# Patient Record
Sex: FEMALE | Race: WHITE | NOT HISPANIC OR LATINO | URBAN - METROPOLITAN AREA
[De-identification: names, ages, dates, MRNs, and addresses within clinical notes are randomized per-mention and may not be internally consistent; named-entity substitution may affect disease eponyms.]

---

## 2018-10-02 ENCOUNTER — HOSPITAL ENCOUNTER (OUTPATIENT)
Dept: RADIOLOGY | Facility: HOSPITAL | Age: 43
Discharge: HOME | End: 2018-10-02
Payer: COMMERCIAL

## 2018-10-02 ENCOUNTER — OFFICE VISIT (OUTPATIENT)
Dept: FAMILY MEDICINE CLINIC | Facility: CLINIC | Age: 43
End: 2018-10-02
Payer: COMMERCIAL

## 2018-10-02 VITALS
RESPIRATION RATE: 16 BRPM | HEIGHT: 66 IN | BODY MASS INDEX: 21.86 KG/M2 | OXYGEN SATURATION: 98 % | DIASTOLIC BLOOD PRESSURE: 80 MMHG | HEART RATE: 76 BPM | WEIGHT: 136 LBS | SYSTOLIC BLOOD PRESSURE: 110 MMHG

## 2018-10-02 DIAGNOSIS — M54.2 CERVICAL PAIN (NECK): ICD-10-CM

## 2018-10-02 DIAGNOSIS — L72.0 EPIDERMAL CYST: ICD-10-CM

## 2018-10-02 DIAGNOSIS — B07.8 OTHER VIRAL WARTS: Primary | ICD-10-CM

## 2018-10-02 PROCEDURE — 99213 OFFICE O/P EST LOW 20 MIN: CPT | Performed by: FAMILY MEDICINE

## 2018-10-02 PROCEDURE — 72050 X-RAY EXAM NECK SPINE 4/5VWS: CPT

## 2018-10-02 PROCEDURE — 17110 DESTRUCTION B9 LES UP TO 14: CPT | Performed by: FAMILY MEDICINE

## 2018-10-02 NOTE — PATIENT INSTRUCTIONS
Liquid nitrogen to work today  Reschedule for late elective excision of scalp cyst   Check x-ray cervical spine  Sleep with neck in the neutral position  A trial of Aleve 2 tablets twice daily for 10-14 days  If this does not help resolve the pain, either intensify chiropractic treatment or call for referral for the formal physical therapy

## 2018-10-02 NOTE — PROGRESS NOTES
[address]         NAME: Roni Figueredo is a 38 y o  female  : 1975    MRN: [id number]  DATE: 2018  TIME: 12:13 PM    Assessment and Plan   Other viral warts [B07 8]  1  Other viral warts     2  Epidermal cyst     3  Cervical pain (neck)         No problem-specific Assessment & Plan notes found for this encounter  Lesion Destruction  Date/Time: 10/2/2018 1:19 PM  Performed by: Mar Castellon  Authorized by: Mar Castellon     Procedure Details - Lesion Destruction:     Number of Lesions:  1  Lesion 1:     Body area:  Lower extremity    Lower extremity location:  L lower leg    Initial size (mm):  7    Malignancy: benign lesion      Destruction method: cryotherapy    Lesion 6:      Wart- liquid nitrogen- local care discussed          Patient Instructions     Patient Instructions   Liquid nitrogen to work today  Reschedule for late elective excision of scalp cyst   Check x-ray cervical spine  Sleep with neck in the neutral position  A trial of Aleve 2 tablets twice daily for 10-14 days  If this does not help resolve the pain, either intensify chiropractic treatment or call for referral for the formal physical therapy  Chief Complaint     Chief Complaint   Patient presents with    Cyst     cyst on scalp, skin lesion, neck pain    Physical Exam     HM         History of Present Illness       1) Epidermal cyst L posterior scalp  2) wart left ankle  3) Neck stiffness - long term- acute flare over 3-4 weeks  Review of Systems   Review of Systems   Constitutional: Negative for chills, fatigue and fever  Respiratory: Negative for cough, shortness of breath and wheezing  Cardiovascular: Negative for chest pain, palpitations and leg swelling  Musculoskeletal: Positive for neck pain and neck stiffness  Negative for arthralgias and myalgias  Current Medications     No current outpatient prescriptions on file      Current Allergies Allergies as of 10/02/2018    (No Known Allergies)            The following portions of the patient's history were reviewed and updated as appropriate: allergies, current medications, past family history, past medical history, past social history, past surgical history and problem list      No past medical history on file  No past surgical history on file  Family History   Problem Relation Age of Onset    No Known Problems Mother     Hypertension Father     Drug abuse Maternal Uncle     Depression Maternal Uncle          Medications have been verified  Objective   /80 (BP Location: Right arm, Patient Position: Sitting, Cuff Size: Standard)   Pulse 76   Resp 16   Ht 5' 6" (1 676 m)   Wt 61 7 kg (136 lb)   SpO2 98%   BMI 21 95 kg/m²        Physical Exam     Physical Exam   Constitutional: She appears well-developed and well-nourished  No distress  Cardiovascular: Normal rate and regular rhythm  Pulmonary/Chest: Effort normal and breath sounds normal  No respiratory distress  Musculoskeletal:        Cervical back: She exhibits decreased range of motion, tenderness and pain  She exhibits no bony tenderness, no deformity and no spasm  Skin:   Left-sided scalp-approximately 7-8 mm epidermal cyst, no erythema or inflammation noted  Left lower leg approximately a 5-6 mm wart present

## 2018-10-04 NOTE — PROGRESS NOTES
Call patient with lab result-call with neck x-ray report-does show some degenerative changes  I would advise formal physical therapy for 3-4 weeks  If not getting better may need to see pain management or get MRI

## 2018-10-05 ENCOUNTER — HOSPITAL ENCOUNTER (EMERGENCY)
Facility: HOSPITAL | Age: 43
Discharge: HOME | End: 2018-10-06
Attending: EMERGENCY MEDICINE | Admitting: EMERGENCY MEDICINE
Payer: COMMERCIAL

## 2018-10-05 ENCOUNTER — APPOINTMENT (EMERGENCY)
Dept: RADIOLOGY | Facility: HOSPITAL | Age: 43
End: 2018-10-05
Payer: COMMERCIAL

## 2018-10-05 ENCOUNTER — TELEPHONE (OUTPATIENT)
Dept: FAMILY MEDICINE CLINIC | Facility: CLINIC | Age: 43
End: 2018-10-05

## 2018-10-05 VITALS
SYSTOLIC BLOOD PRESSURE: 123 MMHG | RESPIRATION RATE: 15 BRPM | DIASTOLIC BLOOD PRESSURE: 62 MMHG | BODY MASS INDEX: 21.79 KG/M2 | WEIGHT: 135 LBS | HEART RATE: 61 BPM | OXYGEN SATURATION: 100 % | TEMPERATURE: 97.8 F

## 2018-10-05 DIAGNOSIS — S90.32XA CONTUSION OF LEFT FOOT, INITIAL ENCOUNTER: Primary | ICD-10-CM

## 2018-10-05 DIAGNOSIS — M50.90 CERVICAL DISC DISORDER: Primary | ICD-10-CM

## 2018-10-05 LAB — EXT PREG TEST URINE: NORMAL

## 2018-10-05 PROCEDURE — 99284 EMERGENCY DEPT VISIT MOD MDM: CPT

## 2018-10-05 PROCEDURE — 81025 URINE PREGNANCY TEST: CPT | Performed by: EMERGENCY MEDICINE

## 2018-10-05 PROCEDURE — 73630 X-RAY EXAM OF FOOT: CPT

## 2018-10-05 RX ORDER — IBUPROFEN 400 MG/1
400 TABLET ORAL ONCE
Status: COMPLETED | OUTPATIENT
Start: 2018-10-05 | End: 2018-10-05

## 2018-10-05 RX ADMIN — IBUPROFEN 400 MG: 400 TABLET, FILM COATED ORAL at 22:38

## 2018-10-05 NOTE — TELEPHONE ENCOUNTER
----- Message from Cora Hickey MD sent at 10/4/2018 11:43 AM EDT -----  Call patient with lab result-call with neck x-ray report-does show some degenerative changes  I would advise formal physical therapy for 3-4 weeks  If not getting better may need to see pain management or get MRI

## 2018-10-06 NOTE — DISCHARGE INSTRUCTIONS
Foot Contusion   WHAT YOU NEED TO KNOW:   A foot contusion is a bruise to the foot  DISCHARGE INSTRUCTIONS:   Medicines:   · NSAIDs:  These medicines decrease swelling and pain  NSAIDs are available without a doctor's order  Ask your healthcare provider which medicine is right for you  Ask how much to take and when to take it  Take as directed  NSAIDs can cause stomach bleeding and kidney problems if not taken correctly  · Take your medicine as directed  Contact your healthcare provider if you think your medicine is not helping or if you have side effects  Tell him of her if you are allergic to any medicine  Keep a list of the medicines, vitamins, and herbs you take  Include the amounts, and when and why you take them  Bring the list or the pill bottles to follow-up visits  Carry your medicine list with you in case of an emergency  Follow up with your healthcare provider as directed:  Write down your questions so you remember to ask them during your visits  Care for your foot: Follow your treatment plan to help decrease your pain and improve your muscle movement  · Rest:  You will need to rest your foot for 1 to 2 days after your injury  This will help decrease the risk of more damage  · Ice:  Ice helps decrease swelling and pain  Ice may also help prevent tissue damage  Use an ice pack, or put crushed ice in a plastic bag  Cover it with a towel and place it on your foot for 15 to 20 minutes every hour or as directed  · Compression:  Compression (tight hold) provides support and helps decrease swelling and movement so your foot can heal  You may be told to keep your foot wrapped with a tight elastic bandage  Follow instructions about how to apply your bandage  Do not massage your foot  You could cause more damage or pain  · Elevation:  Keep your foot raised above the level of your heart while you are sitting or lying down  This will help decrease or limit swelling   Use pillows, blankets, or rolled towels to elevate your foot comfortably  Exercise your foot:  You may be given gentle exercises to improve your foot movement and help decrease stiffness  Ask when you can return to your normal activities or sports  Prevent another injury:   · Wear equipment to protect yourself when you play sports  · Make sure your shoes fit properly  · Always wear shoes on streets or sidewalks  · Clean spills off the floor right away to avoid slipping or hitting your foot  · Make sure your home is well lit when you get up during the night  This will help you avoid hurting your foot in the dark  Contact your healthcare provider if:   · You have increased swelling on your foot  · You have severe foot pain  · You are not able to move your foot  · You have questions or concerns about your injury or treatment  © 2017 [employer]  Information is for End User's use only and may not be sold, redistributed or otherwise used for commercial purposes  All illustrations and images included in CareNotes® are the copyrighted property of A D [employer] M [employer]  or Daniel Prasad  The above information is an  only  It is not intended as medical advice for individual conditions or treatments  Talk to your doctor, nurse or pharmacist before following any medical regimen to see if it is safe and effective for you
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
## 2018-10-09 ENCOUNTER — VBI (OUTPATIENT)
Dept: ADMINISTRATIVE | Facility: OTHER | Age: 43
End: 2018-10-09

## 2018-10-10 NOTE — TELEPHONE ENCOUNTER
Roni Figueredo    ED Visit Information     Ed visit date: 10/05/2018  Diagnosis Description:Contusion of left foot, initial encounter  In Network? Yes [facility]  Discharge status: Home  Discharged with meds ? No  Number of ED visits to date: 1  ED Severity:4     Outreach Information    Outreach successful: Yes 1  Date letter mailed:N/a  Date Finalized:10/10/2018    Care Coordination    Follow up appointment with pcp: no Declined  Transportation issues ? No    Value [employer] type:  7 Day Outreach  Emergent necessity warranted by diagnosis:  No  [facility] PCP:  Yes  Transportation:  Self Transport  Called PCP first?:  No  Feels able to call PCP for urgent problems ?:  Yes  Understands what emergencies can be handled by PCP ?:  Yes  Ever any problems getting appointment with PCP for minor emergency/urgency problems?:  No  Practice Contacted Patient ?:  No  Pt had ED follow up with pcp/staff ?:  No    Seen for follow-up out of network ?:  No  Reason Patient went to ED instead of Urgent Care or PCP?:  Proximity  Urgent care Education?:  Yes  10/09/2018 03:54 PM Phone (Alesha Mac) Giovanni Casiano (Self) [phone] (H)   Left Message - Requesting a call back    10/10/2018 02:33 PM Phone (Incoming) Giovanni Casiano (Self) [phone] (H)   Personal communication with patient:    Patient stated that she is doing well s/p ED visit on 10/05/2018 for Contusion of left foot, initial encounter  Patient was discharged without medication and was advised to follow up with pcp as needed  Patient decliend to schedule f/u appt at this time  Patient does not meet OPCM criteria, denies any transportation issues and was not aware of her nearest [place] [address] urgent care facility, education given

## 2018-10-15 ENCOUNTER — EVALUATION (OUTPATIENT)
Dept: PHYSICAL THERAPY | Facility: CLINIC | Age: 43
End: 2018-10-15
Payer: COMMERCIAL

## 2018-10-15 DIAGNOSIS — M50.90 CERVICAL DISC DISORDER: ICD-10-CM

## 2018-10-15 PROCEDURE — 97140 MANUAL THERAPY 1/> REGIONS: CPT | Performed by: PHYSICAL THERAPIST

## 2018-10-15 PROCEDURE — 97161 PT EVAL LOW COMPLEX 20 MIN: CPT | Performed by: PHYSICAL THERAPIST

## 2018-10-15 PROCEDURE — 97110 THERAPEUTIC EXERCISES: CPT | Performed by: PHYSICAL THERAPIST

## 2018-10-15 PROCEDURE — G8982 BODY POS GOAL STATUS: HCPCS | Performed by: PHYSICAL THERAPIST

## 2018-10-15 PROCEDURE — G8981 BODY POS CURRENT STATUS: HCPCS | Performed by: PHYSICAL THERAPIST

## 2018-10-15 NOTE — PROGRESS NOTES
PT Evaluation     Today's date: 10/15/2018  Patient name: Willem Flores  : 1975  MRN: [id number]  Referring provider: Aide Colvin MD  Dx:   Encounter Diagnosis     ICD-10-CM    1  Cervical disc disorder M50 90 Ambulatory referral to Physical Therapy       Start Time: 1400  Stop Time: 1455  Total time in clinic (min): 55 minutes    Assessment  Functional limitations: pain with prolonged sitting, pain with driving, pain with end range rotation both directions, pain with computer work  Assessment details: Pt is a 39 yo female with diagnosis of cervical degenerative disc disease presenting with decreased ROM, decreased scapular stability, postural deficits, and the above functional limitations  She is an excellent candidate for outpatient physical therapy to address the above limitations and optimize functional status  Understanding of Dx/Px/POC: good   Prognosis: good    Goals  STG - 2-3 weeks  1  Independent with HEP  2  Improve postural awareness to decrease forward head posture during daily tasks     LTG - 4-6 weeks  1  Increase strength of scapular stabilizers by 1 MMT grade to improve posture  2  Tolerate sitting/driving 1-2 hours without pain to improve quality of life  3  Improve ROM of C/S retraction and extension by 25% to improve posture and body mechanics        Plan  Patient would benefit from: skilled physical therapy  Planned modality interventions: low level laser therapy, unattended electrical stimulation, cryotherapy and thermotherapy: hydrocollator packs  Planned therapy interventions: manual therapy, neuromuscular re-education, therapeutic activities, therapeutic exercise, patient education, postural training and home exercise program  Frequency: 2x week  Duration in weeks: 6  Treatment plan discussed with: patient  Plan details: Skilled outpatient PT 1-2x/week x 4-6 weeks  Provided with and reviewed initial written HEP        Subjective Evaluation    History of Present Illness  Date of onset: 2011  Mechanism of injury: Pt began having neck pain in  exacerbated by working at the computer, carrying her young son, and breastfeeding  She saw a chiropractor regularly in the past but has not been going recently  She arrives with referral to outpatient PT today  PMH significant for miscarriage with D&E, tubal ligation  Pain  No pain reported  Current pain ratin  At best pain ratin  At worst pain rating: 3  Location: neck (L>R)  Quality: sharp  Aggravating factors: keyboarding and sitting  Progression: no change    Social Support    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, 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, 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  Hand dominance: right  Exercise history: walking, exercise videos      Diagnostic Tests  X-ray: abnormal (degenerative disc disease, mild foraminal narrowing)  Treatments  Previous treatment: chiropractic  Patient Goals  Patient goals for therapy: decreased pain, increased motion and independence with ADLs/IADLs          Objective     Postural Observations  Seated posture: poor  Standing posture: fair  Correction of posture: makes symptoms better        Palpation   Left   Tenderness of the upper trapezius  Trigger point to upper trapezius  Right Tenderness of the upper trapezius  Trigger point to upper trapezius  Active Range of Motion   Cervical/Thoracic Spine   Cervical    Subcranial retraction: Active cervical subcranial retraction: 25% decreased     Flexion: 30 degrees with pain  Extension: 65 degrees   Left lateral flexion: 45 degrees   Right lateral flexion: 45 degrees   Left rotation: WFL and with pain  Right rotation: WFL and with pain    Thoracic   Flexion: WFL  Extension: [facility]    Strength/Myotome Testing     Left Shoulder     Planes of Motion   Flexion: 5   Abduction: 5   External rotation at 0°: 5   Internal rotation at 0°: 5     Isolated Muscles   Lower trapezius: 4-   Middle trapezius: 4     Right Shoulder     Planes of Motion   Flexion: 5   Abduction: 5   External rotation at 0°: 4   Internal rotation at 0°: 5     Isolated Muscles   Lower trapezius: 4-   Middle trapezius: 4     Left Elbow   Flexion: 5  Extension: 5    Right Elbow   Flexion: 5  Extension: 5    Left Wrist/Hand   Wrist extension: 5  Wrist flexion: 5    Right Wrist/Hand   Wrist extension: 5  Wrist flexion: 5    Tests   Cervical   Positive repeated extension      Additional Tests Details  Pain eliminated after unloaded repeated extension       Flowsheet Rows      Most Recent Value   PT/OT G-Codes   Current Score  75   Projected Score  77   Assessment Type  Evaluation   G code set  Carrying, Moving & Handling Objects   Changing and Maintaining Body Position Current Status ()  CJ   Changing and Maintaining Body Position Goal Status ()  CJ          Precautions: none    Daily Treatment Diary       Manuals 10/15            STM B upper traps             C/S retraction, retr w/ ext 8'                                      Exercise Diary              C/S retraction 5"x10            C/S retr w/ ext 5"x10            Y/T/I over pball 10x ea            Row w/ tband grn 15x            T-row w/ tband grn 15x            B ext w/ tband grn 15x            B ER w/ tband             T/S supine str over foam roll             I, T, chest press supine over foam roll                                                                                                                                                                                      Modalities

## 2018-10-16 ENCOUNTER — OFFICE VISIT (OUTPATIENT)
Dept: FAMILY MEDICINE CLINIC | Facility: CLINIC | Age: 43
End: 2018-10-16
Payer: COMMERCIAL

## 2018-10-16 VITALS
RESPIRATION RATE: 16 BRPM | SYSTOLIC BLOOD PRESSURE: 110 MMHG | WEIGHT: 135 LBS | HEIGHT: 66 IN | BODY MASS INDEX: 21.69 KG/M2 | DIASTOLIC BLOOD PRESSURE: 70 MMHG | HEART RATE: 60 BPM | OXYGEN SATURATION: 98 %

## 2018-10-16 DIAGNOSIS — L72.0 EPIDERMAL CYST: Primary | ICD-10-CM

## 2018-10-16 PROCEDURE — 11420 EXC H-F-NK-SP B9+MARG 0.5/<: CPT | Performed by: FAMILY MEDICINE

## 2018-10-16 NOTE — PROGRESS NOTES
Subjective:   Chief Complaint   Patient presents with    Procedure     cyst on scalp, skin lesion, rash        Patient ID: Anuja Coppola is a 38 y o  female  Patient here for elective removal of sebaceous cyst scalp  She wants removed due to pain when washing and combing her hair  It has slowly gotten larger over the last several months  The following portions of the patient's history were reviewed and updated as appropriate: allergies, current medications, past family history, past medical history, past social history, past surgical history and problem list     Review of Systems      Objective:  Vitals:    10/16/18 1200   BP: 110/70   BP Location: Right arm   Patient Position: Sitting   Cuff Size: Standard   Pulse: 60   Resp: 16   SpO2: 98%   Weight: 61 2 kg (135 lb)   Height: 5' 6" (1 676 m)      Physical Exam   Constitutional: She is oriented to person, place, and time  She appears well-developed and well-nourished  No distress  Cardiovascular: Normal rate, regular rhythm and normal heart sounds  Pulmonary/Chest: Effort normal    Neurological: She is alert and oriented to person, place, and time  Skin:     Left occipital scalp - approximately 8 mm palpable nodule present  No induration erythema noted  Assessment/Plan:    No problem-specific Assessment & Plan notes found for this encounter  Skin excision  Date/Time: 10/16/2018 1:39 PM  Performed by: Qasim Booth  Authorized by: Qasim Booth     Procedure Details - Skin Excision:     Number of Lesions:  1  Lesion 1:     Body area:  Head/neck    Head/neck location:  Scalp    Initial size (mm):  8       Malignancy: benign lesion      Closure complexity: simple      Repair size (cm):  0 8       Epidermal cyst- no pus found when incision made  Simple closure with 2 sutures -4 0 nylon           Diagnoses and all orders for this visit:    Epidermal cyst

## 2018-10-22 ENCOUNTER — OFFICE VISIT (OUTPATIENT)
Dept: PHYSICAL THERAPY | Facility: CLINIC | Age: 43
End: 2018-10-22
Payer: COMMERCIAL

## 2018-10-22 DIAGNOSIS — M50.90 CERVICAL DISC DISORDER: Primary | ICD-10-CM

## 2018-10-22 PROCEDURE — 97110 THERAPEUTIC EXERCISES: CPT | Performed by: PHYSICAL THERAPIST

## 2018-10-22 PROCEDURE — 97112 NEUROMUSCULAR REEDUCATION: CPT | Performed by: PHYSICAL THERAPIST

## 2018-10-22 PROCEDURE — 97140 MANUAL THERAPY 1/> REGIONS: CPT | Performed by: PHYSICAL THERAPIST

## 2018-10-22 NOTE — PROGRESS NOTES
Daily Note     Today's date: 10/22/2018  Patient name: Mallorie Addison  : 1975  MRN: [id number]  Referring provider: Allyssa Singh MD  Dx:   Encounter Diagnosis     ICD-10-CM    1  Cervical disc disorder M50 90                   Subjective: Pt reports she's hardly had any pain over the last week and then pain is less severe when it does occur  Objective: See treatment diary below      Assessment: Tolerated treatment well  Patient exhibited good technique with therapeutic exercises  Reported feeling looser after session  Plan: Continue per plan of care     Precautions: none    Daily Treatment Diary       Manuals 10/15 10/22           STM B upper traps  10'           C/S retraction, retr w/ ext 8' 8'                                     Exercise Diary              C/S retraction 5"x10 5"x10           C/S retr w/ ext 5"x10 5"x10           Y/T/I over pball 10x ea 10x ea           Row w/ tband grn 15x grn 15x           T-row w/ tband grn 15x grn 15x           B ext w/ tband grn 15x grn 15x           B ER w/ tband  15x grn           T/S supine str over foam roll  30"x3           I, T, chest press supine over foam roll  10x ea                                                                                                                                                                                    Modalities

## 2018-10-25 ENCOUNTER — OFFICE VISIT (OUTPATIENT)
Dept: FAMILY MEDICINE CLINIC | Facility: CLINIC | Age: 43
End: 2018-10-25

## 2018-10-25 VITALS
HEIGHT: 66 IN | BODY MASS INDEX: 21.86 KG/M2 | SYSTOLIC BLOOD PRESSURE: 116 MMHG | DIASTOLIC BLOOD PRESSURE: 78 MMHG | WEIGHT: 136 LBS | HEART RATE: 66 BPM | OXYGEN SATURATION: 97 %

## 2018-10-25 DIAGNOSIS — Z23 NEED FOR VACCINATION: Primary | ICD-10-CM

## 2018-10-25 DIAGNOSIS — Z48.02 VISIT FOR SUTURE REMOVAL: ICD-10-CM

## 2018-10-25 PROCEDURE — 99024 POSTOP FOLLOW-UP VISIT: CPT | Performed by: FAMILY MEDICINE

## 2018-10-25 PROCEDURE — 1036F TOBACCO NON-USER: CPT | Performed by: FAMILY MEDICINE

## 2018-10-25 PROCEDURE — 3008F BODY MASS INDEX DOCD: CPT | Performed by: FAMILY MEDICINE

## 2018-10-25 NOTE — PROGRESS NOTES
Subjective:   Chief Complaint   Patient presents with    Suture / Staple Removal     on scalp-- we placed them         Patient ID: Agnieszka Quinn is a 38 y o  female  Here for suture removal   Laceration healing well  Suture removal  Date/Time: 10/25/2018 2:05 PM  Performed by: Avril Haywood by: Jon Marino     Patient location:  Clinic  Procedure details: Tools used:  Scissors    Wound appearance:  No sign(s) of infection and clean  Post-procedure details:     Post-removal:  No dressing applied  Comments:      Healing surgical incision from previous epidermal cysts removed by me  The following portions of the patient's history were reviewed and updated as appropriate: allergies, current medications, past family history, past medical history, past social history, past surgical history and problem list     Review of Systems      Objective:  Vitals:    10/25/18 1322   BP: 116/78   Pulse: 66   SpO2: 97%   Weight: 61 7 kg (136 lb)   Height: 5' 6" (1 676 m)      Physical Exam   Skin:   Healing laceration, no erythema or induration noted  Assessment/Plan:    No problem-specific Assessment & Plan notes found for this encounter         Diagnoses and all orders for this visit:    Need for vaccination  -     influenza vaccine, 8961-3444, quadrivalent, recombinant, PF, 0 5 mL, for patients 18-49 yr with comorbidities ([facility])    Other orders  -     Cancel: TDAP VACCINE GREATER THAN OR EQUAL TO 6YO IM

## 2018-10-29 ENCOUNTER — OFFICE VISIT (OUTPATIENT)
Dept: PHYSICAL THERAPY | Facility: CLINIC | Age: 43
End: 2018-10-29
Payer: COMMERCIAL

## 2018-10-29 DIAGNOSIS — M50.90 CERVICAL DISC DISORDER: Primary | ICD-10-CM

## 2018-10-29 PROCEDURE — 97112 NEUROMUSCULAR REEDUCATION: CPT | Performed by: PHYSICAL THERAPIST

## 2018-10-29 PROCEDURE — 97110 THERAPEUTIC EXERCISES: CPT | Performed by: PHYSICAL THERAPIST

## 2018-10-29 PROCEDURE — 97140 MANUAL THERAPY 1/> REGIONS: CPT | Performed by: PHYSICAL THERAPIST

## 2018-10-29 NOTE — PROGRESS NOTES
Daily Note     Today's date: 10/29/2018  Patient name: Emma Haddad  : 1975  MRN: [id number]  Referring provider: Juan Reyes MD  Dx:   Encounter Diagnosis     ICD-10-CM    1  Cervical disc disorder M50 90                   Subjective: Pt reports she was painting and moving furniture all weekend so she is feeling sore  She did her retractions and extensions last evening when she felt particularly sore and they relieved her pain  Objective: See treatment diary below      Assessment: Tolerated treatment well  Patient demonstrated fatigue post treatment      Plan: Continue per plan of care       Precautions: none    Daily Treatment Diary       Manuals 10/15 10/22 10/29          STM B upper traps  10' 10'          C/S retraction, retr w/ ext 8' 8' 8'                                    Exercise Diary              C/S retraction 5"x10 5"x10 5"x15          C/S retr w/ ext 5"x10 5"x10 5"x15          Y/T/I over pball 10x ea 10x ea 15x          Row w/ tband grn 15x grn 15x 20x GTB          T-row w/ tband grn 15x grn 15x 20x GTB          B ext w/ tband grn 15x grn 15x 20x GTB          B ER w/ tband  15x grn 20x GTB          T/S supine str over foam roll  30"x3 30"x3          W, I, T, chest press supine over foam roll  10x ea 10x ea                                                                                                                                                                                   Modalities

## 2018-11-05 ENCOUNTER — OFFICE VISIT (OUTPATIENT)
Dept: PHYSICAL THERAPY | Facility: CLINIC | Age: 43
End: 2018-11-05
Payer: COMMERCIAL

## 2018-11-05 DIAGNOSIS — M50.90 CERVICAL DISC DISORDER: Primary | ICD-10-CM

## 2018-11-05 PROCEDURE — 97110 THERAPEUTIC EXERCISES: CPT | Performed by: PHYSICAL THERAPIST

## 2018-11-05 PROCEDURE — G8986 CARRY D/C STATUS: HCPCS | Performed by: PHYSICAL THERAPIST

## 2018-11-05 PROCEDURE — G8985 CARRY GOAL STATUS: HCPCS | Performed by: PHYSICAL THERAPIST

## 2018-11-05 PROCEDURE — 97140 MANUAL THERAPY 1/> REGIONS: CPT | Performed by: PHYSICAL THERAPIST

## 2018-11-05 PROCEDURE — 97112 NEUROMUSCULAR REEDUCATION: CPT | Performed by: PHYSICAL THERAPIST

## 2018-11-05 NOTE — PROGRESS NOTES
Daily Note     Today's date: 2018  Patient name: Senia Guzman  : 1975  MRN: [id number]  Referring provider: Юлия Armijo MD  Dx:   Encounter Diagnosis     ICD-10-CM    1  Cervical disc disorder M50 90                   Subjective: Pt reports she is feeling much better and only has mild stiffness at end range left rotation  She is comfortable with her HEP and feels she can be discharged at this time  Objective: See treatment diary below      Assessment: Tolerated treatment well  Patient demonstrates normalized AROM and presents no continued functional limitations  Goals  STG - 2-3 weeks  1  Independent with HEP - MET  2  Improve postural awareness to decrease forward head posture during daily tasks - MET     LTG - 4-6 weeks  1  Increase strength of scapular stabilizers by 1 MMT grade to improve posture  - partially met  2  Tolerate sitting/driving 1-2 hours without pain to improve quality of life  - MET  3   Improve ROM of C/S retraction and extension by 25% to improve posture and body mechanics  - MET    Plan: Discharge to HEP    Precautions: none    Daily Treatment Diary       Manuals 10/15 10/22 10/29 11         STM B upper traps  10' 10' 8'         C/S retraction, retr w/ ext 8' 8' 8' 8'                                   Exercise Diary              C/S retraction 5"x10 5"x10 5"x15 5"x15         C/S retr w/ ext 5"x10 5"x10 5"x15 5"x15         Y/T/I over pball 10x ea 10x ea 15x          Row w/ tband grn 15x grn 15x 20x GTB 20x blue         T-row w/ tband grn 15x grn 15x 20x GTB 20x blue         B ext w/ tband grn 15x grn 15x 20x GTB 20x blue         B ER w/ tband  15x grn 20x GTB 20x blue         T/S supine str over foam roll  30"x3 30"x3 30"x3         W, I, T, chest press supine over foam roll  10x ea 10x ea 20x ea Modalities

## 2019-10-29 ENCOUNTER — OFFICE VISIT (OUTPATIENT)
Dept: FAMILY MEDICINE CLINIC | Facility: CLINIC | Age: 44
End: 2019-10-29
Payer: COMMERCIAL

## 2019-10-29 VITALS
DIASTOLIC BLOOD PRESSURE: 72 MMHG | HEART RATE: 65 BPM | BODY MASS INDEX: 22.34 KG/M2 | SYSTOLIC BLOOD PRESSURE: 110 MMHG | HEIGHT: 66 IN | WEIGHT: 139 LBS | OXYGEN SATURATION: 98 %

## 2019-10-29 DIAGNOSIS — B07.8 OTHER VIRAL WARTS: ICD-10-CM

## 2019-10-29 DIAGNOSIS — Z13.6 SCREENING FOR CARDIOVASCULAR CONDITION: ICD-10-CM

## 2019-10-29 DIAGNOSIS — Z00.00 ANNUAL PHYSICAL EXAM: Primary | ICD-10-CM

## 2019-10-29 DIAGNOSIS — F41.1 GENERALIZED ANXIETY DISORDER: ICD-10-CM

## 2019-10-29 DIAGNOSIS — R53.82 CHRONIC FATIGUE: ICD-10-CM

## 2019-10-29 DIAGNOSIS — Z12.31 SCREENING MAMMOGRAM, ENCOUNTER FOR: ICD-10-CM

## 2019-10-29 DIAGNOSIS — Z86.2 HISTORY OF IRON DEFICIENCY ANEMIA: ICD-10-CM

## 2019-10-29 PROCEDURE — 17110 DESTRUCTION B9 LES UP TO 14: CPT | Performed by: FAMILY MEDICINE

## 2019-10-29 PROCEDURE — 99396 PREV VISIT EST AGE 40-64: CPT | Performed by: FAMILY MEDICINE

## 2019-10-29 PROCEDURE — 3008F BODY MASS INDEX DOCD: CPT | Performed by: FAMILY MEDICINE

## 2019-10-29 PROCEDURE — 99214 OFFICE O/P EST MOD 30 MIN: CPT | Performed by: FAMILY MEDICINE

## 2019-10-29 NOTE — PATIENT INSTRUCTIONS

## 2019-10-29 NOTE — PROGRESS NOTES
ADULT ANNUAL PHYSICAL  [address]    NAME: Jewell Hernandez  AGE: 41 y o  SEX: female  : 1975     DATE: 10/29/2019     Assessment and Plan:     Problem List Items Addressed This Visit     None      Visit Diagnoses     Screening mammogram, encounter for    -  Primary    Relevant Orders    Mammo screening bilateral w cad    Annual physical exam              Immunizations and preventive care screenings were discussed with patient today  Appropriate education was printed on patient's after visit summary  Counseling:  Dental Health: discussed importance of regular tooth brushing, flossing, and dental visits  Injury prevention: discussed safety/seat belts, safety helmets, smoke detectors, carbon dioxide detectors, and smoking near bedding or upholstery  · Exercise: the importance of regular exercise/physical activity was discussed  Recommend exercise 3-5 times per week for at least 30 minutes  No follow-ups on file  Chief Complaint:     Chief Complaint   Patient presents with    Annual Exam    Verrucous Vulgaris     on right hand     Anxiety     for 1 years       History of Present Illness:     Adult Annual Physical   Patient here for a comprehensive physical exam  The patient reports see problem list     Diet and Physical Activity  · Diet/Nutrition: well balanced diet and consuming 3-5 servings of fruits/vegetables daily  · Exercise: 1-2 times a week on average  Depression Screening  PHQ-9 Depression Screening    PHQ-9:    Frequency of the following problems over the past two weeks:       Little interest or pleasure in doing things:  0 - not at all  Feeling down, depressed, or hopeless:  1 - several days  PHQ-2 Score:  1       General Health  · Sleep: sleeps well and gets 7-8 hours of sleep on average  · Hearing: normal - bilateral   · Vision: previous LASIK surgery  · Dental: regular dental visits         /GYN Health  · Patient is: premenopausal  · Last menstrual period: 10/7/19  · Contraceptive method: tubal ligation  Review of Systems:     Review of Systems   Constitutional: Positive for fatigue  Negative for activity change, appetite change, diaphoresis and fever  Respiratory: Negative for cough, chest tightness, shortness of breath and wheezing  Cardiovascular: Negative for chest pain, palpitations and leg swelling  Fast or slow heart rate   Gastrointestinal: Negative for abdominal pain, blood in stool, constipation, diarrhea, nausea and vomiting  Genitourinary: Negative for difficulty urinating, dysuria and frequency  Musculoskeletal: Negative for arthralgias, gait problem, joint swelling and myalgias  Neurological: Negative for dizziness, weakness, light-headedness, numbness and headaches  Psychiatric/Behavioral: Positive for dysphoric mood  Negative for agitation, confusion, sleep disturbance and suicidal ideas  The patient is nervous/anxious  Past Medical History:     Past Medical History:   Diagnosis Date    No known health problems       Past Surgical History:     Past Surgical History:   Procedure Laterality Date    TUBAL LIGATION        Social History:     Social History     Socioeconomic History    Marital status: /Civil Union     Spouse name: None    Number of children: None    Years of education: None    Highest education level: None   Occupational History    None   Social Needs    Financial resource strain: None    Food insecurity:     Worry: None     Inability: None    Transportation needs:     Medical: None     Non-medical: None   Tobacco Use    Smoking status: Never Smoker    Smokeless tobacco: Never Used   Substance and Sexual Activity    Alcohol use:  Yes    Drug use: No    Sexual activity: None   Lifestyle    Physical activity:     Days per week: None     Minutes per session: None    Stress: None   Relationships    Social connections:     Talks on phone: None     Gets together: None     Attends Mosque service: None     Active member of club or organization: None     Attends meetings of clubs or organizations: None     Relationship status: None    Intimate partner violence:     Fear of current or ex partner: None     Emotionally abused: None     Physically abused: None     Forced sexual activity: None   Other Topics Concern    None   Social History Narrative    None      Family History:     Family History   Problem Relation Age of Onset    No Known Problems Mother     Hypertension Father     Drug abuse Maternal Uncle     Depression Maternal Uncle       Current Medications:     No current outpatient medications on file  No current facility-administered medications for this visit  Allergies:     No Known Allergies   Physical Exam:     /72   Pulse 65   Ht 5' 6" (1 676 m)   Wt 63 kg (139 lb)   SpO2 98%   BMI 22 44 kg/m²     Physical Exam   Constitutional: She is oriented to person, place, and time  She appears well-developed and well-nourished  No distress  HENT:   Head: Normocephalic and atraumatic  Right Ear: Tympanic membrane, external ear and ear canal normal    Left Ear: Tympanic membrane, external ear and ear canal normal    Nose: No mucosal edema or rhinorrhea  Right sinus exhibits no maxillary sinus tenderness  Left sinus exhibits no maxillary sinus tenderness  Mouth/Throat: Uvula is midline  Mucous membranes are not pale and not dry  Normal dentition  No oropharyngeal exudate  Eyes: Pupils are equal, round, and reactive to light  EOM are normal  Right eye exhibits no discharge  Left eye exhibits no discharge  Neck: Normal range of motion  Neck supple  No thyromegaly present  Cardiovascular: Normal rate, regular rhythm, normal heart sounds and intact distal pulses  No murmur heard  Pulmonary/Chest: Effort normal and breath sounds normal  No respiratory distress  She has no wheezes  She has no rales  Abdominal: Soft  Bowel sounds are normal  She exhibits no distension  There is no tenderness  Musculoskeletal: Normal range of motion  She exhibits no edema or tenderness  Lymphadenopathy:     She has no cervical adenopathy  Neurological: She is alert and oriented to person, place, and time  No cranial nerve deficit  Skin: She is not diaphoretic  Psychiatric: She has a normal mood and affect   Her behavior is normal        Sen Acuña MD  [id number]

## 2019-10-29 NOTE — PROGRESS NOTES
Subjective:   Chief Complaint   Patient presents with    Annual Exam    Verrucous Vulgaris     on right hand     Anxiety     for 1 years         Patient ID: Daljit Varghese is a 41 y o  female  Patient comes into day for evaluation of ongoing fatigue for the last 1 year  There are some home stressors present due to step children care  PHQ-9 score is 5  There is certain amount of anxiety present  Patient has had counseling in the past   No current medications  Had been on Lexapro in the past   No recent metabolic screens  Patient also has a wart on her right hand  Not responding over-the-counter measures  The following portions of the patient's history were reviewed and updated as appropriate: allergies, current medications, past family history, past medical history, past social history, past surgical history and problem list     Review of Systems   Constitutional: Positive for fatigue  Negative for appetite change, chills, diaphoresis and fever  HENT: Positive for trouble swallowing  Negative for congestion, ear pain, rhinorrhea, sinus pressure and sore throat  Eyes: Negative for discharge, redness and itching  Respiratory: Negative for cough, shortness of breath and wheezing  Cardiovascular: Negative for chest pain and palpitations  Rapid or slow heart rate   Gastrointestinal: Negative for abdominal pain, diarrhea, nausea and vomiting  Musculoskeletal: Positive for neck stiffness  Psychiatric/Behavioral: Negative for behavioral problems, decreased concentration, dysphoric mood, sleep disturbance and suicidal ideas  The patient is nervous/anxious  Objective:  Vitals:    10/29/19 1051   BP: 110/72   Pulse: 65   SpO2: 98%   Weight: 63 kg (139 lb)   Height: 5' 6" (1 676 m)      Physical Exam   Constitutional: She is oriented to person, place, and time  She appears well-developed and well-nourished  No distress  HENT:   Head: Normocephalic and atraumatic     Right Ear: Tympanic membrane and external ear normal  No drainage  Left Ear: Tympanic membrane normal  No drainage  Mouth/Throat: No oropharyngeal exudate  Eyes: Conjunctivae and EOM are normal  Right eye exhibits no discharge  Left eye exhibits no discharge  Neck: Normal range of motion  Neck supple  No thyromegaly present  Cardiovascular: Normal rate, regular rhythm and normal heart sounds  Pulmonary/Chest: Effort normal  No respiratory distress  She has no wheezes  She has no rales  Abdominal: Soft  Lymphadenopathy:     She has no cervical adenopathy  Neurological: She is alert and oriented to person, place, and time  Skin:   Right hand-there is approximately a 9 mm wart on the dorsum of the right hand  Psychiatric: She has a normal mood and affect  Her behavior is normal            Assessment/Plan:    No problem-specific Assessment & Plan notes found for this encounter  Diagnoses and all orders for this visit:    Annual physical exam    Chronic fatigue  -     Comprehensive metabolic panel; Future  -     TSH, 3rd generation; Future  -     CBC and differential; Future  -     Vitamin D 25 hydroxy; Future  -     Comprehensive metabolic panel  -     TSH, 3rd generation  -     CBC and differential  -     Vitamin D 25 hydroxy    Generalized anxiety disorder  -     TSH, 3rd generation; Future  -     TSH, 3rd generation    History of iron deficiency anemia  -     CBC and differential; Future  -     Ferritin; Future  -     CBC and differential  -     Ferritin    Other viral warts    Screening mammogram, encounter for  -     Mammo screening bilateral w cad; Future    Screening for cardiovascular condition  -     Comprehensive metabolic panel; Future  -     Lipid panel;  Future  -     Comprehensive metabolic panel  -     Lipid panel      Lesion Destruction  Date/Time: 10/29/2019 12:49 PM  Performed by: Lias Serrano MD  Authorized by: Lisa Serrano MD     Procedure Details - Lesion Destruction:     Number of Lesions:  1  Lesion 1:     Body area:  Upper extremity    Upper extremity location:  R hand    Initial size (mm):  9    Malignancy: benign lesion      Destruction method: cryotherapy    Lesion 6:      Patient tolerated procedure well  Local cared reviewed

## 2019-11-02 ENCOUNTER — TELEPHONE (OUTPATIENT)
Dept: FAMILY MEDICINE CLINIC | Facility: CLINIC | Age: 44
End: 2019-11-02

## 2019-11-02 LAB
25(OH)D3+25(OH)D2 SERPL-MCNC: 53.4 NG/ML (ref 30–100)
ALBUMIN SERPL-MCNC: 4.4 G/DL (ref 3.5–5.5)
ALBUMIN/GLOB SERPL: 1.8 {RATIO} (ref 1.2–2.2)
ALP SERPL-CCNC: 38 IU/L (ref 39–117)
ALT SERPL-CCNC: 10 IU/L (ref 0–32)
AST SERPL-CCNC: 14 IU/L (ref 0–40)
BASOPHILS # BLD AUTO: 0 X10E3/UL (ref 0–0.2)
BASOPHILS NFR BLD AUTO: 1 %
BILIRUB SERPL-MCNC: 0.9 MG/DL (ref 0–1.2)
BUN SERPL-MCNC: 6 MG/DL (ref 6–24)
BUN/CREAT SERPL: 8 (ref 9–23)
CALCIUM SERPL-MCNC: 9.4 MG/DL (ref 8.7–10.2)
CHLORIDE SERPL-SCNC: 105 MMOL/L (ref 96–106)
CHOLEST SERPL-MCNC: 177 MG/DL (ref 100–199)
CHOLEST/HDLC SERPL: 3.3 RATIO (ref 0–4.4)
CO2 SERPL-SCNC: 22 MMOL/L (ref 20–29)
CREAT SERPL-MCNC: 0.78 MG/DL (ref 0.57–1)
EOSINOPHIL # BLD AUTO: 0.1 X10E3/UL (ref 0–0.4)
EOSINOPHIL NFR BLD AUTO: 1 %
ERYTHROCYTE [DISTWIDTH] IN BLOOD BY AUTOMATED COUNT: 13.1 % (ref 12.3–15.4)
FERRITIN SERPL-MCNC: 51 NG/ML (ref 15–150)
GLOBULIN SER-MCNC: 2.4 G/DL (ref 1.5–4.5)
GLUCOSE SERPL-MCNC: 98 MG/DL (ref 65–99)
HCT VFR BLD AUTO: 41.1 % (ref 34–46.6)
HDLC SERPL-MCNC: 53 MG/DL
HGB BLD-MCNC: 13.8 G/DL (ref 11.1–15.9)
IMM GRANULOCYTES # BLD: 0 X10E3/UL (ref 0–0.1)
IMM GRANULOCYTES NFR BLD: 0 %
LDLC SERPL CALC-MCNC: 109 MG/DL (ref 0–99)
LYMPHOCYTES # BLD AUTO: 2.1 X10E3/UL (ref 0.7–3.1)
LYMPHOCYTES NFR BLD AUTO: 33 %
MCH RBC QN AUTO: 30.8 PG (ref 26.6–33)
MCHC RBC AUTO-ENTMCNC: 33.6 G/DL (ref 31.5–35.7)
MCV RBC AUTO: 92 FL (ref 79–97)
MONOCYTES # BLD AUTO: 0.5 X10E3/UL (ref 0.1–0.9)
MONOCYTES NFR BLD AUTO: 8 %
NEUTROPHILS # BLD AUTO: 3.8 X10E3/UL (ref 1.4–7)
NEUTROPHILS NFR BLD AUTO: 57 %
PLATELET # BLD AUTO: 277 X10E3/UL (ref 150–450)
POTASSIUM SERPL-SCNC: 4.1 MMOL/L (ref 3.5–5.2)
PROT SERPL-MCNC: 6.8 G/DL (ref 6–8.5)
RBC # BLD AUTO: 4.48 X10E6/UL (ref 3.77–5.28)
SL AMB EGFR AFRICAN AMERICAN: 109 ML/MIN/1.73
SL AMB EGFR NON AFRICAN AMERICAN: 95 ML/MIN/1.73
SL AMB VLDL CHOLESTEROL CALC: 15 MG/DL (ref 5–40)
SODIUM SERPL-SCNC: 140 MMOL/L (ref 134–144)
TRIGL SERPL-MCNC: 74 MG/DL (ref 0–149)
TSH SERPL DL<=0.005 MIU/L-ACNC: 4 UIU/ML (ref 0.45–4.5)
WBC # BLD AUTO: 6.5 X10E3/UL (ref 3.4–10.8)

## 2020-01-10 ENCOUNTER — TELEPHONE (OUTPATIENT)
Dept: FAMILY MEDICINE CLINIC | Facility: CLINIC | Age: 45
End: 2020-01-10

## 2020-01-10 NOTE — TELEPHONE ENCOUNTER
Patient says she keeps receiving reminders in mail to get a Mammogram done  Says she is not planning on getting a mammogram scheduled at this time  She wants to wait until she turns 52

## 2021-04-27 ENCOUNTER — HOSPITAL ENCOUNTER (OUTPATIENT)
Dept: MAMMOGRAPHY | Facility: IMAGING CENTER | Age: 46
Discharge: HOME | End: 2021-04-27
Payer: COMMERCIAL

## 2021-04-27 VITALS — BODY MASS INDEX: 21.53 KG/M2 | WEIGHT: 134 LBS | HEIGHT: 66 IN

## 2021-04-27 DIAGNOSIS — Z12.31 SCREENING MAMMOGRAM, ENCOUNTER FOR: ICD-10-CM

## 2021-04-27 PROCEDURE — 77063 BREAST TOMOSYNTHESIS BI: CPT

## 2021-04-27 PROCEDURE — 77067 SCR MAMMO BI INCL CAD: CPT

## 2022-05-16 ENCOUNTER — HOSPITAL ENCOUNTER (OUTPATIENT)
Dept: MAMMOGRAPHY | Facility: IMAGING CENTER | Age: 47
Discharge: HOME | End: 2022-05-16
Payer: COMMERCIAL

## 2022-05-16 VITALS — BODY MASS INDEX: 22.66 KG/M2 | HEIGHT: 65 IN | WEIGHT: 136 LBS

## 2022-05-16 DIAGNOSIS — Z12.31 SCREENING MAMMOGRAM FOR HIGH-RISK PATIENT: ICD-10-CM

## 2022-05-16 PROCEDURE — 77063 BREAST TOMOSYNTHESIS BI: CPT

## 2022-05-16 PROCEDURE — 77067 SCR MAMMO BI INCL CAD: CPT

## 2022-07-28 NOTE — TELEPHONE ENCOUNTER
----- Message from Valdo Hart MD sent at 11/2/2019  9:43 AM EDT -----  Call patient with lab result- labs all look good
Spoke with pt aware normal results
Gestational Hypertension

## 2023-03-07 ENCOUNTER — OFFICE VISIT (OUTPATIENT)
Dept: DERMATOLOGY | Facility: CLINIC | Age: 48
End: 2023-03-07

## 2023-03-07 VITALS — WEIGHT: 142.2 LBS | TEMPERATURE: 97.3 F | BODY MASS INDEX: 23.69 KG/M2 | HEIGHT: 65 IN

## 2023-03-07 DIAGNOSIS — L70.9 ACNE, UNSPECIFIED ACNE TYPE: Primary | ICD-10-CM

## 2023-03-07 RX ORDER — SPIRONOLACTONE 50 MG/1
TABLET, FILM COATED ORAL
Qty: 90 TABLET | Refills: 1 | Status: SHIPPED | OUTPATIENT
Start: 2023-03-07

## 2023-03-07 RX ORDER — ESCITALOPRAM OXALATE 5 MG/1
5 TABLET ORAL EVERY EVENING
COMMUNITY
Start: 2022-12-19

## 2023-03-07 NOTE — PROGRESS NOTES
[facility] Note     Patient Name: Gee Rivera  Encounter Date: 03/07/23     Have you been cared for by a [place] [id number] Dermatologist in the last 3 years and, if so, which description applies to you? NO  I am considered a "new" patient and must complete all patient intake questions  I am FEMALE/of child-bearing potential     REVIEW OF SYSTEMS:  Have you recently had or currently have any of the following? · Recent fever or chills? No  · Any non-healing wound? No  · Are you pregnant or planning to become pregnant? No  · Are you currently or planning to be nursing or breast feeding? No   PAST MEDICAL HISTORY:  Have you personally ever had or currently have any of the following? If "YES," then please provide more detail  · Skin cancer (such as Melanoma, Basal Cell Carcinoma, Squamous Cell Carcinoma? No  · Tuberculosis, HIV/AIDS, Hepatitis B or C: No  · Systemic Immunosuppression such as Diabetes, Biologic or Immunotherapy, Chemotherapy, Organ Transplantation, Bone Marrow Transplantation No  · Radiation Treatment No   FAMILY HISTORY:  Any "first degree relatives" (parent, brother, sister, or child) with the following? • Skin Cancer, Pancreatic or Other Cancer? No   PATIENT EXPERIENCE:    • Do you want the Dermatologist to perform a COMPLETE skin exam today including a clinical examination under the "bra and underwear" areas? NO  • If necessary, do we have your permission to call and leave a detailed message on your Preferred Phone number that includes your specific medical information?   Yes      No Known Allergies   Current Outpatient Medications:   •  escitalopram (LEXAPRO) 5 mg tablet, Take 5 mg by mouth every evening, Disp: , Rfl:           • Whom besides the patient is providing clinical information about today's encounter?   o NO ADDITIONAL HISTORIAN (patient alone provided history)    Physical Exam and Assessment/Plan by Diagnosis:    Acne     Physical Exam:  · Anatomic Location Affected &  Morphological Description:  papules, pustules, cysts on the face jawline  Scarring present on face (subacute)  · Pertinent Positives:  · Pertinent Negatives: Additional History of Present Condition:  Patient has had acne since 37 years ( since childhood) affecting the following body sites: face, chin and has scarring  Thus far, they have tried the following treatments: OTC medicine, red tayla face wash and Kiehl's face cream  Interested in scar treatment  Still gets several pimples before her period  Thinks she may be perimenopausal     Patient reports flares before or during menstrual cycle: yes, breakout before menstrual cycle    Patient currently taking any birth control or hormonal supplements: no    Family members required Accutane: no      Past medical history of:    Depression/anxiety: no  IBD in self or family members: no  History of liver disease: no  History of hyperlipidemia: no          Assessment and Plan: Primarily hormonal acne  Discussed options to include oral antibiotics, oral spironolactone, and topical therapy  Patient is not compliant with consistent topicals, and elects trial of low dose spironolactone for 3 months  • Reviewed the causes of acne, the “kinds” of acne, and the expected clinical course  • Discussed treatment options ranging from over-the-counter products, topical retinoids, antibiotics, BP, hormonal therapies (OCPs/spironolactone), and isotretinoin (Accutane)  • After lengthy discussion of etiology and treatment options, we decided to implement the following personalized treatment plan:    ACNE TREATMENT PLAN:      Morning Routine:     Wash:   Face, back, chest: Benzoyl Peroxide Wash, advised 2- 3 % strength and use in shower  Leave this wash on your skin for about 5 minutes and then rinse it off completely while in the shower  If you do not rinse it off completely, then it will bleach your towels or clothing          Moisturize:   Advised use of daily SPF 30+ sunblock       Nightly Routine:    Wash:  Face: With gentle cleanser such as Dove or Cerave or Cetaphil  If showering more than once daily, can use to back and chest as well  Moisturize:   If you are using other moisturizing products such as facial oils, use them at this step  Make sure they are NON-COMEDOGENIC (not pore clogging) otherwise they may be making your acne worse  We recommend a nightly gentle cream moisturizer such as Cetaphil or Cerave facial moisturizers  Again make usre if you do choose another brand that the cream is non-comedogenic  Acne Pill:  Take prescribed Spironolactone 25 mg nightly for 7 days, then increase to 50 mg nightly    The patient has findings consistent with hormonal acne and would be an excellent candidate for spironolactone  She denies a history of liver or kidney disease or hyperkalemia and eats a balanced diet  The risks of spironolactone were reviewed including birth defects, mood change, irritability, headache, liver injury, hyperkalemia, spotting, breast tenderness, hypotension, dizziness/lightheadedness/orthostatic symptoms, etc    She will maintain birth control or pregnancy prevention practices while taking it and is aware she cannot safely get pregnant on this medication  For any concerns, she will stop the medication and call the office  If the patient is under age 39 and without a history of liver or renal disease, hyperkalemia, or concerning medication interactions, current evidence based practice does not merit routine labs  The risks of estrogen sensitive cancers such as breast cancer shown in high dose animal studies were discussed, as was the most recent meta analysis in humans that fails to show this risk at normal doses used for acne  Follow up in 3 months, sooner for concerns  Consider adding doxycycline or accutane as needed  Given that patient is 47, will check baseline CMP and repeat in 1 month then q 6 months while using medication  She was counseled not to start medication until I contact her with lab results  Remember to always take your acne pills with lots of water! A pill stuck in your throat can cause significant burning and irritation  Drink a full glass of water to ensure the pill gets into your stomach  Avoid “popping” a pill right before bed, and stay upright for at least 1 hour after taking a pill      --------------------------------------------------------------------------------------   ANTIMICROBIAL BENZOYL PEROXIDE (BPO) options:  Below are a few options of BPO washes you may purchase  This medication is available without prescription (over-the-counter) in most drug stores or at [facility] for about $7 a bottle  It is available at a variety of strengths from 2% to 10%  Be sure to select the percentage recommended by us in your acne plan as discussed at your visit  Neutrogena Clear Pore (Benzoyl Peroxide 3% wash)         PanOxyl wash (2 5% or 4-5% preferred)     --------------------------------------------------------------------------------------        PATIENT INFORMATION       USING YOUR TOPICAL TREATMENTS LIKE A PRO  • Apply topical medications only to clean, dry skin  Topical medications may lead to significant dryness of the affected areas  To minimize this, wait 15-20 minutes after washing before applying your topical medication  • These medications work deep in the skin to prevent new breakouts  “Spot treatment” of individual pimples does not do much  When applying topical medications to the face, use the “5-dot” method  Start by placing a small pea-sized amount of the medication on your finger  Then, place “dots” in each of five locations of your face: Mid-forehead, each cheek, nose, and chin  Next, rub the medication into the entire area of skin - not just on individual pimples! Try to avoid the delicate skin around your eyes and corners of your mouth  • The medications are not magic!   They take weeks if not months to work  Be patient and use your medicine on a daily basis or as directed for six weeks before asking if your skin looks better  Try not to miss more than one or two days each week when using your medications  • If you are starting a new medication, then try using it “every other night” or even “every third night ” Gradually work up to [employer] a day ”  This will give your skin time to adjust   • The same medications often come in various forms or formulations: Creams, ointments, lotions, gels, microspheres, or foams  Use the formulation that has been recommended and don't switch to other forms unless instructed  Some forms (such as alcohol based gels) may be more drying and less tolerable for certain skin types  • Sometimes individual medications are not as effective as a combination of two or more agents  The doctor may need to try several medications or combinations before finding the one that is best for that patient  • Moisturizer, sunscreen, and make-up may be used in conjunction with topical acne medications  In general, acne medications are applied first so they may directly contact the skin  Ask your physician to review specific application instructions! • It is especially important to always use sunscreen when using a topical retinoid or oral antibiotic  These drugs can make your skin more sensitive to the sun  In general, sunscreen gets applied AFTER any acne medications  • Don't stop using your acne medications just because your acne got better  Remember, the acne is better because of the medication, and prevention is the eisenberg to treatment  ORAL ACNE MEDICATIONS    ORAL ANTIBIOTICS  Antibiotics include tetracycline-class medicines (which include the most commonly used oral antibiotics for acne, minocycline, and doxycycline), erythromycin, trimethoprim-sulfamethoxazole, and occasionally cephalexin or azithromycin   These drugs may decrease bacteria and inflammation, and they are most effective for moderate-to-severe “inflammatory” acne  A product containing benzoyl peroxide should be used along with these antibiotics to help decrease the possibility of microbial resistance  Always take your acne pills with lots of water! A pill stuck in your throat can cause significant burning and irritation  Drink a full glass of water to ensure the pill gets into your stomach  Avoid “popping” a pill right before bed, and stay upright for at least 1 hour after taking a pill  HAVING PROBLEMS WITH ANY OF YOUR TREATMENTS? You should not be able to see any of the medicines on your face  If you can see a white film on your skin after you apply the medication, there is too much medicine in that area and you need to apply a thinner coat and make sure it is spread evenly on your face  If your skin gets too dry, you can apply a light (“non-comedogenic”) moisturizer on top of your medicine or you may switch to using the medicine every other day instead of every day  If your skin is still too irritated, you may need to switch to a milder medication  If your skin is red and very itchy, you may be allergic to the medication and you should stop using it  COMMON POSSIBLE SIDE EFFECTS OF MEDICATIONS    • Retinoids - dryness, redness, increased sun sensitivity  • Benzoyl peroxide - drying, redness, bleaching of clothes, towels and sheets, allergy  • Doxycycline - headaches; dizziness; irritation of the throat; nail changes; discoloration of teeth  • Sun sensitivity - even if you have dark skin, this medicine can make you burn more easily  Make sure you protect yourself from the sun, either by avoiding being outside between 11 AM and 3 PM, wearing and reapplying sunscreen/sunblock, or wearing sun protective clothing  • Nausea/vomiting - if you experience nausea with this medication, take it with food    • Minocycline - headaches; dizziness; vision problems,  irritation of the throat; discoloration of scars, gums, or teeth  Can rarely cause liver disease, joint pains, and flu-like symptoms  • If you should notice yellowing of the skin or any of the above, notify your doctor and stop using the medication  • Birth Control Pills - nausea; headaches; breast tenderness; feeling bloated; mood changes  • Spotting between periods may occur for the first three weeks of the medication, but this is not serious  It may last for two or three cycles  Please call us if the bleeding is heavier than a light flow or lasts for more than a few days  WHEN AND WHERE TO CALL WITH CONCERNS  We are here to help! If you experience any unusual symptoms, then stop taking or using the medication and call our office at [phone] (SKIN)  It is better to be safe than to be sorry! Make sure all products you use on face are labeled as "non-comedongenic" or "oil-free" or " does not clog pores"  REMEMBER: Acne can be frustrating and difficult to treat  Most acne regimens take 2-3 months to see an improvement, so stick with them  Don't give up! As always, call your doctor if you have any concerns about your medications      Scribe Attestation    I,:  Lori Leahy am acting as a scribe while in the presence of the attending physician :       I,:  Marielle Campos MD personally performed the services described in this documentation    as scribed in my presence :

## 2023-03-07 NOTE — PATIENT INSTRUCTIONS
Assessment and Plan:   Reviewed the causes of acne, the “kinds” of acne, and the expected clinical course  Discussed treatment options ranging from over-the-counter products, topical retinoids, antibiotics, BP, hormonal therapies (OCPs/spironolactone), and isotretinoin (Accutane)  Reviewed specific over-the-counter interventions and medications  Recommended washing regularly with mild cleanser, and refraining from picking and popping any pimples  Recommended non-comedogenic sunscreen use daily  Expectations of therapy discussed  Side effects, risks and benefits of medications discussed  The phone number to call in case of questions or concerns (and instructions to stop medications in such a scenario) was provided  Counseled patient on treatment options  Discussed fading away of post inflammatory erythema and hyperpigmentation and that good sun protection and topical tretinoin would help with this  After lengthy discussion of etiology and treatment options, we decided to implement the following personalized treatment plan:    ACNE TREATMENT PLAN:      Morning Routine:     Wash:   Face, back, chest: Benzoyl Peroxide Wash, advised 2- 3 % strength and use in shower  Leave this wash on your skin for about 5 minutes and then rinse it off completely while in the shower  If you do not rinse it off completely, then it will bleach your towels or clothing  Moisturize:   Advised use of daily SPF 30+ sunblock        Remember to always take your acne pills with lots of water! A pill stuck in your throat can cause significant burning and irritation  Drink a full glass of water to ensure the pill gets into your stomach  Avoid “popping” a pill right before bed or a nap, and stay upright for at least 1 hour after taking a pill  Nightly Routine:    Wash:  Face: With gentle cleanser such as Dove or Cerave or Cetaphil  If showering more than once daily, can use to back and chest as well      Medicate:   Face: Tretinoin 0 025% cream  Before bedtime (after washing your face and allowing the skin to completely dry), spread only a single pea-sized amount of this medication    evenly over your entire face (avoiding your eyes or mouth)  If too irritating, advised to space out to every other night or every 3rd night and slowly increase frequency of use to nightly  Can also use on back and chest if you have these areas involved with acne  Moisturize:   If you are using other moisturizing products such as facial oils, use them at this step  Make sure they are NON-COMEDOGENIC (not pore clogging) otherwise they may be making your acne worse  We recommend a nightly gentle cream moisturizer such as Cetaphil or Cerave facial moisturizers  Again make usre if you do choose another brand that the cream is non-comedogenic  Acne Pill:  Take prescribed Spironolactone 25 mg nightly for 7 days, then increase to 50 mg nightly     Remember to always take your acne pills with lots of water! A pill stuck in your throat can cause significant burning and irritation  Drink a full glass of water to ensure the pill gets into your stomach  Avoid “popping” a pill right before bed, and stay upright for at least 1 hour after taking a pill      --------------------------------------------------------------------------------------   ANTIMICROBIAL BENZOYL PEROXIDE (BPO) options:  Below are a few options of BPO washes you may purchase  This medication is available without prescription (over-the-counter) in most drug stores or at [facility] for about $7 a bottle  It is available at a variety of strengths from 2% to 10%  Be sure to select the percentage recommended by us in your acne plan as discussed at your visit          Neutrogena Clear Pore (Benzoyl Peroxide 3% wash)         PanOxyl wash (2 5% or 4-5% preferred)     --------------------------------------------------------------------------------------        PATIENT INFORMATION       USING YOUR TOPICAL TREATMENTS LIKE A PRO  Apply topical medications only to clean, dry skin  Topical medications may lead to significant dryness of the affected areas  To minimize this, wait 15-20 minutes after washing before applying your topical medication  These medications work deep in the skin to prevent new breakouts  “Spot treatment” of individual pimples does not do much  When applying topical medications to the face, use the “5-dot” method  Start by placing a small pea-sized amount of the medication on your finger  Then, place “dots” in each of five locations of your face: Mid-forehead, each cheek, nose, and chin  Next, rub the medication into the entire area of skin - not just on individual pimples! Try to avoid the delicate skin around your eyes and corners of your mouth  The medications are not magic! They take weeks if not months to work  Be patient and use your medicine on a daily basis or as directed for six weeks before asking if your skin looks better  Try not to miss more than one or two days each week when using your medications  If you are starting a new medication, then try using it “every other night” or even “every third night ” Gradually work up to [facility] a day ”  This will give your skin time to adjust   The same medications often come in various forms or formulations: Creams, ointments, lotions, gels, microspheres, or foams  Use the formulation that has been recommended and don't switch to other forms unless instructed  Some forms (such as alcohol based gels) may be more drying and less tolerable for certain skin types  Sometimes individual medications are not as effective as a combination of two or more agents  The doctor may need to try several medications or combinations before finding the one that is best for that patient  Moisturizer, sunscreen, and make-up may be used in conjunction with topical acne medications   In general, acne medications are applied first so they may directly contact the skin  Ask your physician to review specific application instructions! It is especially important to always use sunscreen when using a topical retinoid or oral antibiotic  These drugs can make your skin more sensitive to the sun  In general, sunscreen gets applied AFTER any acne medications  Don't stop using your acne medications just because your acne got better  Remember, the acne is better because of the medication, and prevention is the eisenberg to treatment  ORAL ACNE MEDICATIONS    ORAL ANTIBIOTICS  Antibiotics include tetracycline-class medicines (which include the most commonly used oral antibiotics for acne, minocycline, and doxycycline), erythromycin, trimethoprim-sulfamethoxazole, and occasionally cephalexin or azithromycin  These drugs may decrease bacteria and inflammation, and they are most effective for moderate-to-severe “inflammatory” acne  A product containing benzoyl peroxide should be used along with these antibiotics to help decrease the possibility of microbial resistance  Always take your acne pills with lots of water! A pill stuck in your throat can cause significant burning and irritation  Drink a full glass of water to ensure the pill gets into your stomach  Avoid “popping” a pill right before bed, and stay upright for at least 1 hour after taking a pill  DOXYCYCLINE   This medication is usually taken ONCE or TWICE per day, as instructed by your physician  NOTE: Always take this medication with lots of water! A pill stuck in the throat can cause significant burning and irritation  Avoid “popping” a pill right before bed & stay upright for at least one hour after taking a pill  WARNING: Doxycycline increases your sensitivity to the sun, so practice excellent sun protection!  If you notice any of the following, stop using the medication and notify your health care provider: headaches; blurred vision; dizziness; sun sensitivity; heartburn-stomach pain; irritation of the esophagus; darkening of scars, gums, or teeth (more often with minocycline); nail changes; yellowing of the eyes or skin (indicating possible liver disease); joint pains-and flu-like symptoms  Taking oral antibiotics with food may help with symptoms of upset stomach  COMMON SIDE EFFECTS: Nausea, vomiting, sun sensitivity; irritation of the throat if taken right before lying down  Less commonly: Headaches; dizziness    The risks of doxycycline were discussed at length including nausea/vomiting/diarrhea, abdominal pain, photosensitivity, esophagitis, and bacterial resistance  The patient was instructed to take the medication with a full meal and a glass of water, and not to lie down for 1 hour after taking  The use of an otc probiotic while on the medication was encouraged to help maintain a healthy gut amee  Use should be limited to 6 months and combined with the use of topical benzoyl peroxide to mitigate the risk of bacterial resistance  We will attempt to limit use to 6 months of therapy and consider escalation to oral isotretinoin as needed if unable to achieve or maintain clearance after 6 months of consistent use  The patient was advised to stop the medication and contact me for concerns prior to their 3 month follow up visit  MINOCYCLINE   This medication is usually taken ONCE or TWICE per day, as instructed by your physician  NOTE: Always take this medication with lots of water! A pill stuck in the throat can cause significant burning and irritation  Avoid “popping” a pill right before bed & stay upright for at least one hour after taking a pill  WARNING: Though less likely than doxycycline, minocycline may increase your sensitivity to the sun, so practice excellent sun protection!  If you notice any of the following, stop using the medication and notify your health care provider: headaches; blurred vision; dizziness; sun sensitivity; heartburn-stomach pain; irritation of the throat; darkening of scars, gums, or teeth; nail changes; yellowing of the eyes or skin (indicating possible liver disease); joint pains-and flu-like symptoms  Taking oral antibiotics with food may help with symptoms of upset stomach  COMMON SIDE EFFECTS: Headaches; dizziness; sun sensitivity; irritation of the throat; discoloration of scars, gums, or teeth (often with minocycline); nail changes  Minocycline can rarely cause liver disease, joint pains, severe skin rashes, and flu-like symptoms  If you should notice yellowing of the eyes or skin, or any of the above, notify your doctor and stop using the medication immediately  The patient was counseled on the risks of minocycline, including but not limited to nausea/vomiting/diarrhea, photosensitivity, vertigo, dyspigmentation (including blue pigment developing within acne scars, on the face, and shins), drug induced liver injury, a lupus like hypersensitivity reaction, other allergic reactions, and the development of bacterial resistance  If being used for acne, use should be limited to 6 months and combined with the use of topical benzoyl peroxide to mitigate this risk  Use of a daily probiotic was also encouraged  We will attempt to limit use to 6 months of therapy and consider escalation to oral isotretinoin as needed if unable to achieve or maintain clearance after 6 months of consistent use  The patient was advised to stop the medication and contact me for concerns prior to their 3 month follow up visit  SPIRONOLACTONE  The patient has findings consistent with hormonal acne and would be an excellent candidate for spironolactone  She denies a history of liver or kidney disease or hyperkalemia and eats a balanced diet     The risks of spironolactone were reviewed including birth defects, mood change, irritability, headache, liver injury, hyperkalemia, spotting, breast tenderness, hypotension, dizziness/lightheadedness/orthostatic symptoms, etc    She will maintain birth control or pregnancy prevention practices while taking it and is aware she cannot safely get pregnant on this medication  For any concerns, she will stop the medication and call the office  If the patient is under age 39 and without a history of liver or renal disease, hyperkalemia, or concerning medication interactions, current evidence based practice does not merit routine labs  The risks of estrogen sensitive cancers such as breast cancer shown in high dose animal studies were discussed, as was the most recent meta analysis in humans that fails to show this risk at normal doses used for acne  Follow up in 3 months, sooner for concerns  HAVING PROBLEMS WITH ANY OF YOUR TREATMENTS? You should not be able to see any of the medicines on your face  If you can see a white film on your skin after you apply the medication, there is too much medicine in that area and you need to apply a thinner coat and make sure it is spread evenly on your face  If your skin gets too dry, you can apply a light (“non-comedogenic”) moisturizer on top of your medicine or you may switch to using the medicine every other day instead of every day  If your skin is still too irritated, you may need to switch to a milder medication  If your skin is red and very itchy, you may be allergic to the medication and you should stop using it  COMMON POSSIBLE SIDE EFFECTS OF MEDICATIONS    Retinoids - dryness, redness, increased sun sensitivity  Benzoyl peroxide - drying, redness, bleaching of clothes, towels and sheets, allergy  Doxycycline - headaches; dizziness; irritation of the throat; nail changes; discoloration of teeth  Sun sensitivity - even if you have dark skin, this medicine can make you burn more easily    Make sure you protect yourself from the sun, either by avoiding being outside between 11 AM and 3 PM, wearing and reapplying sunscreen/sunblock, or wearing sun protective clothing  Nausea/vomiting - if you experience nausea with this medication, take it with food  Minocycline - headaches; dizziness; vision problems,  irritation of the throat; discoloration of scars, gums, or teeth  Can rarely cause liver disease, joint pains, and flu-like symptoms  If you should notice yellowing of the skin or any of the above, notify your doctor and stop using the medication  Birth Control Pills - nausea; headaches; breast tenderness; feeling bloated; mood changes  Spotting between periods may occur for the first three weeks of the medication, but this is not serious  It may last for two or three cycles  Please call us if the bleeding is heavier than a light flow or lasts for more than a few days  WHEN AND WHERE TO CALL WITH CONCERNS  We are here to help! If you experience any unusual symptoms, then stop taking or using the medication and call our office at [phone] (SKIN)  It is better to be safe than to be sorry! Make sure all products you use on face are labeled as "non-comedongenic" or "oil-free" or " does not clog pores"  REMEMBER: Acne can be frustrating and difficult to treat  Most acne regimens take 2-3 months to see an improvement, so stick with them  Don't give up! As always, call your doctor if you have any concerns about your medications

## 2023-03-14 ENCOUNTER — APPOINTMENT (OUTPATIENT)
Dept: LAB | Facility: CLINIC | Age: 48
End: 2023-03-14

## 2023-03-14 LAB
ALBUMIN SERPL BCP-MCNC: 3.6 G/DL (ref 3.5–5)
ALP SERPL-CCNC: 47 U/L (ref 46–116)
ALT SERPL W P-5'-P-CCNC: 24 U/L (ref 12–78)
ANION GAP SERPL CALCULATED.3IONS-SCNC: 3 MMOL/L (ref 4–13)
AST SERPL W P-5'-P-CCNC: 19 U/L (ref 5–45)
BILIRUB SERPL-MCNC: 0.65 MG/DL (ref 0.2–1)
BUN SERPL-MCNC: 13 MG/DL (ref 5–25)
CALCIUM SERPL-MCNC: 9.4 MG/DL (ref 8.3–10.1)
CHLORIDE SERPL-SCNC: 106 MMOL/L (ref 96–108)
CO2 SERPL-SCNC: 28 MMOL/L (ref 21–32)
CREAT SERPL-MCNC: 0.65 MG/DL (ref 0.6–1.3)
GFR SERPL CREATININE-BSD FRML MDRD: 107 ML/MIN/1.73SQ M
GLUCOSE P FAST SERPL-MCNC: 101 MG/DL (ref 65–99)
POTASSIUM SERPL-SCNC: 3.8 MMOL/L (ref 3.5–5.3)
PROT SERPL-MCNC: 7 G/DL (ref 6.4–8.4)
SODIUM SERPL-SCNC: 137 MMOL/L (ref 135–147)

## 2024-01-26 ENCOUNTER — HOSPITAL ENCOUNTER (OUTPATIENT)
Dept: MAMMOGRAPHY | Facility: IMAGING CENTER | Age: 49
Discharge: HOME | End: 2024-01-26
Payer: COMMERCIAL

## 2024-01-26 VITALS — WEIGHT: 138 LBS | HEIGHT: 66 IN | BODY MASS INDEX: 22.18 KG/M2

## 2024-01-26 DIAGNOSIS — Z12.31 ENCOUNTER FOR SCREENING MAMMOGRAM FOR MALIGNANT NEOPLASM OF BREAST: ICD-10-CM

## 2024-01-26 PROCEDURE — 77063 BREAST TOMOSYNTHESIS BI: CPT

## 2024-01-26 PROCEDURE — 77067 SCR MAMMO BI INCL CAD: CPT

## 2025-04-08 ENCOUNTER — HOSPITAL ENCOUNTER (OUTPATIENT)
Dept: MAMMOGRAPHY | Facility: IMAGING CENTER | Age: 50
Discharge: HOME | End: 2025-04-08
Payer: COMMERCIAL

## 2025-04-08 VITALS — HEIGHT: 66 IN | WEIGHT: 139 LBS | BODY MASS INDEX: 22.34 KG/M2

## 2025-04-08 DIAGNOSIS — Z12.31 SCREENING MAMMOGRAM, ENCOUNTER FOR: ICD-10-CM

## 2025-04-08 PROCEDURE — 77067 SCR MAMMO BI INCL CAD: CPT

## 2025-04-08 PROCEDURE — 77063 BREAST TOMOSYNTHESIS BI: CPT

## 2025-05-14 ENCOUNTER — APPOINTMENT (OUTPATIENT)
Dept: PHYSICAL THERAPY | Facility: CLINIC | Age: 50
End: 2025-05-14
Payer: COMMERCIAL

## 2025-05-16 ENCOUNTER — EVALUATION (OUTPATIENT)
Dept: PHYSICAL THERAPY | Facility: CLINIC | Age: 50
End: 2025-05-16
Payer: COMMERCIAL

## 2025-05-16 ENCOUNTER — APPOINTMENT (OUTPATIENT)
Dept: PHYSICAL THERAPY | Facility: CLINIC | Age: 50
End: 2025-05-16
Payer: COMMERCIAL

## 2025-05-16 DIAGNOSIS — M25.562 CHRONIC PAIN OF LEFT KNEE: Primary | ICD-10-CM

## 2025-05-16 DIAGNOSIS — G89.29 CHRONIC PAIN OF LEFT KNEE: Primary | ICD-10-CM

## 2025-05-16 PROCEDURE — 97161 PT EVAL LOW COMPLEX 20 MIN: CPT | Performed by: PHYSICAL THERAPIST

## 2025-05-16 PROCEDURE — 97110 THERAPEUTIC EXERCISES: CPT | Performed by: PHYSICAL THERAPIST

## 2025-05-16 NOTE — LETTER
May 16, 2025    Douglas Charles Shoenberger, MD  [address] [place] [id number]    Patient: Karly Vee   YOB: 1975   Date of Visit: 2025     Encounter Diagnosis     ICD-10-CM    1. Chronic pain of left knee  M25.562     G89.29           Dear Dr. Douglas Charles Shoenberger, MD:    Thank you for your recent referral of Karly Vee. Please review the attached evaluation summary from Karly's recent visit.     Please verify that you agree with the plan of care by signing the attached order.     If you have any questions or concerns, please do not hesitate to call.     I sincerely appreciate the opportunity to share in the care of one of your patients and hope to have another opportunity to work with you in the near future.       Sincerely,    Theresa Hurtado, PT      Referring Provider:      I certify that I have read the below Plan of Care and certify the need for these services furnished under this plan of treatment while under my care.                    Douglas Charles Shoenberger, MD  [address] [place] [id number]  Via Fax: [phone]          PT Evaluation     Today's date: 2025  Patient name: Karly Vee  : 1975  MRN: [id number]  Referring provider: Shoenberger, Douglas Ch*  Dx:   Encounter Diagnosis     ICD-10-CM    1. Chronic pain of left knee  M25.562     G89.29                      Assessment  Impairments: impaired physical strength, lacks appropriate home exercise program and pain with function  Functional limitations: pain with exercise and recreation (running)  Symptom irritability: moderate    Assessment details: Karly Vee is a 49 y.o. female with chronic L knee pain. Pt has tenderness in medial jt line and pes anserine. Pain reproduced with closing of medial joint. Pt currently has pain with exercise and recreation (running). Pt would benefit from skilled PT to address the above impairments and to  return to pain free function.    Understanding of Dx/Px/POC: good     Prognosis: good    Goals  1. Pt will be independent with HEP upon DC.  2. Pt will have minimal tenderness with palpation.  3. Pt's pain will be no more than 1/10 to allow for exercise without discomfort.  4. Pt will reports return to 100% PLOF.      Plan  Patient would benefit from: skilled physical therapy  Planned modality interventions: low level laser therapy    Planned therapy interventions: joint mobilization, manual therapy, neuromuscular re-education, patient education, therapeutic activities, therapeutic exercise, strengthening and home exercise program    Frequency: 2x week  Duration in weeks: 6  Treatment plan discussed with: patient      Subjective Evaluation    History of Present Illness  Onset date: chronic.  Mechanism of injury: Pt reports an insidious onset of L knee pain that began about a year ago. Pt reports that pain onsets with exercises and high impact activities. Pt reports that Dr feels she has a torn mensicus however she is unable to get MRI approved until she completes PT.           Recurrent probem    Quality of life: good    Patient Goals  Patient goals for therapy: decreased pain and return to sport/leisure activities    Pain  Current pain ratin  At best pain ratin  At worst pain ratin  Location: L knee  Quality: dull ache  Aggravating factors: running  Progression: no change      Diagnostic Tests  X-ray: normal  Treatments  No previous or current treatments      Objective     Palpation   Left   Tenderness of the distal semitendinosus.     Tenderness   Left Knee   Tenderness in the medial joint line and pes anserinus.     Active Range of Motion   Left Knee   Normal active range of motion    Strength/Myotome Testing     Left Hip   Planes of Motion   Flexion: 4+  Extension: 4+  Abduction: 4+    Left Knee   Normal strength    Tests     Left Knee   Positive medial Bri.              Precautions:  "none      Manuals 5/16            IASTM to pes anserine attachments             Laser to med jt line                                       Neuro Re-Ed                                                                                                        Ther Ex             Upright bike             HS stretch with strap 3x30\"            Prone quad stretch with strap 3x30\"            Standing adductor stretch 3x30\"            SLS with tband rot             Standing fire hydrant             Reverse clamshells                          Ther Activity             Step ups (lat/cross)             [facility] step ups             Goblet squats             Side stepping with tband             Ecc leg lowering             Ecc SL sit to stand                                                               "

## 2025-05-16 NOTE — PROGRESS NOTES
PT Evaluation     Today's date: 2025  Patient name: Karly Vee  : 1975  MRN: [id number]  Referring provider: Shoenberger, Douglas Ch*  Dx:   Encounter Diagnosis     ICD-10-CM    1. Chronic pain of left knee  M25.562     G89.29                      Assessment  Impairments: impaired physical strength, lacks appropriate home exercise program and pain with function  Functional limitations: pain with exercise and recreation (running)  Symptom irritability: moderate    Assessment details: Karly Vee is a 49 y.o. female with chronic L knee pain. Pt has tenderness in medial jt line and pes anserine. Pain reproduced with closing of medial joint. Pt currently has pain with exercise and recreation (running). Pt would benefit from skilled PT to address the above impairments and to return to pain free function.    Understanding of Dx/Px/POC: good     Prognosis: good    Goals  1. Pt will be independent with HEP upon DC.  2. Pt will have minimal tenderness with palpation.  3. Pt's pain will be no more than 1/10 to allow for exercise without discomfort.  4. Pt will reports return to 100% PLOF.      Plan  Patient would benefit from: skilled physical therapy  Planned modality interventions: low level laser therapy    Planned therapy interventions: joint mobilization, manual therapy, neuromuscular re-education, patient education, therapeutic activities, therapeutic exercise, strengthening and home exercise program    Frequency: 2x week  Duration in weeks: 6  Treatment plan discussed with: patient      Subjective Evaluation    History of Present Illness  Onset date: chronic.  Mechanism of injury: Pt reports an insidious onset of L knee pain that began about a year ago. Pt reports that pain onsets with exercises and high impact activities. Pt reports that Dr feels she has a torn mensicus however she is unable to get MRI approved until she completes PT.           Recurrent probem    Quality of life: good    Patient  "Goals  Patient goals for therapy: decreased pain and return to sport/leisure activities    Pain  Current pain ratin  At best pain ratin  At worst pain ratin  Location: L knee  Quality: dull ache  Aggravating factors: running  Progression: no change      Diagnostic Tests  X-ray: normal  Treatments  No previous or current treatments      Objective     Palpation   Left   Tenderness of the distal semitendinosus.     Tenderness   Left Knee   Tenderness in the medial joint line and pes anserinus.     Active Range of Motion   Left Knee   Normal active range of motion    Strength/Myotome Testing     Left Hip   Planes of Motion   Flexion: 4+  Extension: 4+  Abduction: 4+    Left Knee   Normal strength    Tests     Left Knee   Positive medial Bri.              Precautions: none      Manuals             IASTM to pes anserine attachments             Laser to med jt line                                       Neuro Re-Ed                                                                                                        Ther Ex             Upright bike             HS stretch with strap 3x30\"            Prone quad stretch with strap 3x30\"            Standing adductor stretch 3x30\"            SLS with tband rot             Standing fire hydrant             Reverse clamshells                          Ther Activity             Step ups (lat/cross)             [facility] step ups             Goblet squats             Side stepping with tband             Ecc leg lowering             Ecc SL sit to stand                                              "

## 2025-05-19 ENCOUNTER — OFFICE VISIT (OUTPATIENT)
Dept: PHYSICAL THERAPY | Facility: CLINIC | Age: 50
End: 2025-05-19
Payer: COMMERCIAL

## 2025-05-19 DIAGNOSIS — G89.29 CHRONIC PAIN OF LEFT KNEE: Primary | ICD-10-CM

## 2025-05-19 DIAGNOSIS — M25.562 CHRONIC PAIN OF LEFT KNEE: Primary | ICD-10-CM

## 2025-05-19 PROCEDURE — 97530 THERAPEUTIC ACTIVITIES: CPT | Performed by: PHYSICAL THERAPIST

## 2025-05-19 PROCEDURE — 97140 MANUAL THERAPY 1/> REGIONS: CPT | Performed by: PHYSICAL THERAPIST

## 2025-05-19 PROCEDURE — 97110 THERAPEUTIC EXERCISES: CPT | Performed by: PHYSICAL THERAPIST

## 2025-05-19 NOTE — PROGRESS NOTES
"Daily Note     Today's date: 2025  Patient name: Karly Vee  : 1975  MRN: [id number]  Referring provider: Shoenberger, Douglas Ch*  Dx:   Encounter Diagnosis     ICD-10-CM    1. Chronic pain of left knee  M25.562     G89.29                      Subjective: Pt reports compliance with HEP.       Objective: See treatment diary below      Assessment: Pt had good tolerance to initiation of program. No pain reported t/o session.       Plan: Continue per plan of care.      Precautions: none      Manuals            IASTM to pes anserine attachments  MD           Laser to med jt line  14W 4'                                       Neuro Re-Ed                                                                                                        Ther Ex             Upright bike  6'           HS stretch with strap 3x30\" HEP           Prone quad stretch with strap 3x30\" HEP           Standing adductor stretch 3x30\" HEP           SLS with tband rot  Blue x15 ea           Standing fire hydrant  Black 5\"x15           Reverse clamshells  Green 5\"x15                        Ther Activity             Step ups (lat/cross)  8\"x15 ea           BOSU step ups (f/l)  X15 ea           Goblet squats             Side stepping with tband             Ecc leg lowering  8\"x15           Ecc SL sit to stand             Lunges over airex             SL leg press                         "

## 2025-05-21 ENCOUNTER — APPOINTMENT (OUTPATIENT)
Dept: PHYSICAL THERAPY | Facility: CLINIC | Age: 50
End: 2025-05-21
Payer: COMMERCIAL

## 2025-05-28 ENCOUNTER — HOSPITAL ENCOUNTER (OUTPATIENT)
Dept: PULMONOLOGY | Facility: HOSPITAL | Age: 50
Discharge: HOME | End: 2025-05-28
Attending: FAMILY MEDICINE
Payer: COMMERCIAL

## 2025-05-28 DIAGNOSIS — R06.02 SHORTNESS OF BREATH: ICD-10-CM

## 2025-05-28 PROCEDURE — 94726 PLETHYSMOGRAPHY LUNG VOLUMES: CPT | Performed by: INTERNAL MEDICINE

## 2025-05-28 PROCEDURE — 94760 N-INVAS EAR/PLS OXIMETRY 1: CPT

## 2025-05-28 PROCEDURE — 94729 DIFFUSING CAPACITY: CPT

## 2025-05-28 PROCEDURE — 94060 EVALUATION OF WHEEZING: CPT | Performed by: INTERNAL MEDICINE

## 2025-05-28 PROCEDURE — 94060 EVALUATION OF WHEEZING: CPT

## 2025-05-28 PROCEDURE — 94729 DIFFUSING CAPACITY: CPT | Performed by: INTERNAL MEDICINE

## 2025-05-28 PROCEDURE — 94726 PLETHYSMOGRAPHY LUNG VOLUMES: CPT

## 2025-05-28 RX ORDER — ALBUTEROL SULFATE 0.83 MG/ML
2.5 SOLUTION RESPIRATORY (INHALATION) ONCE
Status: COMPLETED | OUTPATIENT
Start: 2025-05-28 | End: 2025-05-28

## 2025-05-28 RX ADMIN — ALBUTEROL SULFATE 2.5 MG: 2.5 SOLUTION RESPIRATORY (INHALATION) at 09:19

## 2025-05-29 ENCOUNTER — APPOINTMENT (OUTPATIENT)
Dept: PHYSICAL THERAPY | Facility: CLINIC | Age: 50
End: 2025-05-29
Payer: COMMERCIAL

## 2025-06-02 ENCOUNTER — OFFICE VISIT (OUTPATIENT)
Dept: PHYSICAL THERAPY | Facility: CLINIC | Age: 50
End: 2025-06-02
Payer: COMMERCIAL

## 2025-06-02 DIAGNOSIS — G89.29 CHRONIC PAIN OF LEFT KNEE: Primary | ICD-10-CM

## 2025-06-02 DIAGNOSIS — M25.562 CHRONIC PAIN OF LEFT KNEE: Primary | ICD-10-CM

## 2025-06-02 PROCEDURE — 97530 THERAPEUTIC ACTIVITIES: CPT | Performed by: PHYSICAL THERAPIST

## 2025-06-02 PROCEDURE — 97110 THERAPEUTIC EXERCISES: CPT | Performed by: PHYSICAL THERAPIST

## 2025-06-02 PROCEDURE — 97140 MANUAL THERAPY 1/> REGIONS: CPT | Performed by: PHYSICAL THERAPIST

## 2025-06-02 NOTE — PROGRESS NOTES
"Daily Note     Today's date: 2025  Patient name: Karly Vee  : 1975  MRN: [id number]  Referring provider: Shoenberger, Douglas Ch*  Dx:   Encounter Diagnosis     ICD-10-CM    1. Chronic pain of left knee  M25.562     G89.29                      Subjective: Pt reports no new changes.       Objective: See treatment diary below      Assessment: Pt had good tolerance to progression of program. Reported fatigue only.       Plan: Continue per plan of care.      Precautions: none      Manuals           IASTM to pes anserine attachments  MD MD          Laser to med jt line  14W 4' 14W 4'                                      Neuro Re-Ed                                                                                                        Ther Ex             Upright bike  6' 8'          HS stretch with strap 3x30\" HEP           Prone quad stretch with strap 3x30\" HEP           Standing adductor stretch 3x30\" HEP           SLS with tband rot  Blue x15 ea Blue x15 ea          Standing fire hydrant  Black 5\"x15           Reverse clamshells  Green 5\"x15 Black 5\"x20                       Ther Activity             Step ups (lat/cross)  8\"x15 ea 8\"x20 ea          BOSU step ups (f/l)  X15 ea X20 ea          Goblet squats             Side stepping with tband   Black x2 laps          Ecc leg lowering  8\"x15 8\"x20          Ecc SL sit to stand   Airex on chair x15          Lunges over airex   X10 ea          SL leg press                           "

## 2025-06-11 ENCOUNTER — OFFICE VISIT (OUTPATIENT)
Dept: PHYSICAL THERAPY | Facility: CLINIC | Age: 50
End: 2025-06-11
Payer: COMMERCIAL

## 2025-06-11 DIAGNOSIS — G89.29 CHRONIC PAIN OF LEFT KNEE: Primary | ICD-10-CM

## 2025-06-11 DIAGNOSIS — M25.562 CHRONIC PAIN OF LEFT KNEE: Primary | ICD-10-CM

## 2025-06-11 PROCEDURE — 97110 THERAPEUTIC EXERCISES: CPT | Performed by: PHYSICAL THERAPIST

## 2025-06-11 PROCEDURE — 97140 MANUAL THERAPY 1/> REGIONS: CPT | Performed by: PHYSICAL THERAPIST

## 2025-06-11 PROCEDURE — 97530 THERAPEUTIC ACTIVITIES: CPT | Performed by: PHYSICAL THERAPIST

## 2025-06-11 NOTE — PROGRESS NOTES
"Daily Note     Today's date: 2025  Patient name: Karly Vee  : 1975  MRN: [id number]  Referring provider: Shoenberger, Douglas Ch*  Dx:   Encounter Diagnosis     ICD-10-CM    1. Chronic pain of left knee  M25.562     G89.29                      Subjective: Pt reports that she's been pain free for the last week.       Objective: See treatment diary below      Assessment: Pt had good tolerance to progression of program. Tenderness reported with IASTM.      Plan: Continue per plan of care.      Precautions: none      Manuals          IASTM to geovanna llamas MD, MD, MD         Laser to med jt line  14W 4' 14W 4' 14W 4'                                     Neuro Re-Ed                                                                                                        Ther Ex             Upright bike  6' 8' 8'         HS stretch with strap 3x30\" HEP           Prone quad stretch with strap 3x30\" HEP           Standing adductor stretch 3x30\" HEP           SLS with tband rot  Blue x15 ea Blue x15 ea Blue x20 ea         Standing fire hydrant  Black 5\"x15           Reverse clamshells  Green 5\"x15 Black 5\"x20 Black 5\"x20                      Ther Activity             Step ups (lat/cross)  8\"x15 ea 8\"x20 ea 8\"x20 ea         BOSU step ups (f/l)  X15 ea X20 ea X20 ea         Goblet squats             Side stepping with tband   Black x2 laps Black 2 laps         Ecc leg lowering  8\"x15 8\"x20 8\"x20         Ecc SL sit to stand   Airex on chair x15 Airex on chair x20         Lunges over airex   X10 ea X15 ea         SL leg press                             "

## 2025-06-16 ENCOUNTER — OFFICE VISIT (OUTPATIENT)
Dept: PHYSICAL THERAPY | Facility: CLINIC | Age: 50
End: 2025-06-16
Payer: COMMERCIAL

## 2025-06-16 DIAGNOSIS — G89.29 CHRONIC PAIN OF LEFT KNEE: Primary | ICD-10-CM

## 2025-06-16 DIAGNOSIS — M25.562 CHRONIC PAIN OF LEFT KNEE: Primary | ICD-10-CM

## 2025-06-16 PROCEDURE — 97140 MANUAL THERAPY 1/> REGIONS: CPT | Performed by: PHYSICAL THERAPIST

## 2025-06-16 PROCEDURE — 97530 THERAPEUTIC ACTIVITIES: CPT | Performed by: PHYSICAL THERAPIST

## 2025-06-16 PROCEDURE — 97110 THERAPEUTIC EXERCISES: CPT | Performed by: PHYSICAL THERAPIST

## 2025-06-16 NOTE — PROGRESS NOTES
"Daily Note     Today's date: 2025  Patient name: Karly Vee  : 1975  MRN: [id number]  Referring provider: Shoenberger, Douglas *  Dx:   Encounter Diagnosis     ICD-10-CM    1. Chronic pain of left knee  M25.562     G89.29                      Subjective: Pt reports that she's been feeling pretty good.       Objective: See treatment diary below      Assessment: Pt had good tolerance to all activities. No pain reported t/o session.       Plan: No further skilled PT required.      Precautions: none      Manuals         IASTM to geovanna llamas MD, MD, MD, MD        Laser to med jt line  14W 4' 14W 4' 14W 4' 14W 4'                                    Neuro Re-Ed                                                                                                        Ther Ex             Upright bike  6' 8' 8' 8'        HS stretch with strap 3x30\" HEP           Prone quad stretch with strap 3x30\" HEP           Standing adductor stretch 3x30\" HEP           SLS with tband rot  Blue x15 ea Blue x15 ea Blue x20 ea Blue x20 ea        Standing fire hydrant  Black 5\"x15           Reverse clamshells  Green 5\"x15 Black 5\"x20 Black 5\"x20 Black 5\"x20                     Ther Activity             Step ups (lat/cross)  8\"x15 ea 8\"x20 ea 8\"x20 ea 8\"x20 ea        BOSU step ups (f/l)  X15 ea X20 ea X20 ea X20 ea        Goblet squats             Side stepping with tband   Black x2 laps Black 2 laps Black 2 laps        Ecc leg lowering  8\"x15 8\"x20 8\"x20 8\"x20        Ecc SL sit to stand   Airex on chair x15 Airex on chair x20 Airex on chair x20        Lunges over airex   X10 ea X15 ea X15 ea        SL leg press                               "

## 2025-06-24 ENCOUNTER — APPOINTMENT (OUTPATIENT)
Dept: PHYSICAL THERAPY | Facility: CLINIC | Age: 50
End: 2025-06-24
Payer: COMMERCIAL

## 2025-06-25 ENCOUNTER — OFFICE VISIT (OUTPATIENT)
Dept: PHYSICAL THERAPY | Facility: CLINIC | Age: 50
End: 2025-06-25
Payer: COMMERCIAL

## 2025-06-25 DIAGNOSIS — M25.562 CHRONIC PAIN OF LEFT KNEE: Primary | ICD-10-CM

## 2025-06-25 DIAGNOSIS — G89.29 CHRONIC PAIN OF LEFT KNEE: Primary | ICD-10-CM

## 2025-06-25 PROCEDURE — 97140 MANUAL THERAPY 1/> REGIONS: CPT | Performed by: PHYSICAL THERAPIST

## 2025-06-25 PROCEDURE — 97110 THERAPEUTIC EXERCISES: CPT | Performed by: PHYSICAL THERAPIST

## 2025-06-25 PROCEDURE — 97530 THERAPEUTIC ACTIVITIES: CPT | Performed by: PHYSICAL THERAPIST

## 2025-06-25 NOTE — PROGRESS NOTES
"Daily Note     Today's date: 2025  Patient name: Karly Vee  : 1975  MRN: [id number]  Referring provider: Shoenberger, Douglas *  Dx:   Encounter Diagnosis     ICD-10-CM    1. Chronic pain of left knee  M25.562     G89.29                      Subjective: Pt reports that she continues to have throbbing pain with walking.       Objective: See treatment diary below      Assessment: Pt had goof tolerance to all activities. Pt to transition to independent HEP and f/u with ortho.       Plan: Maximum rehab potential has been reached at this time. No further skilled PT required.      Precautions: none      Manuals        IASTM to geovanna llamas MD, MD, MD, MD, MD       Laser to med jt line  14W 4' 14W 4' 14W 4' 14W 4' 14W 4'                                   Neuro Re-Ed                                                                                                        Ther Ex             Upright bike  6' 8' 8' 8' 8'       HS stretch with strap 3x30\" HEP           Prone quad stretch with strap 3x30\" HEP           Standing adductor stretch 3x30\" HEP           SLS with tband rot  Blue x15 ea Blue x15 ea Blue x20 ea Blue x20 ea Blue x20 ea       Standing fire hydrant  Black 5\"x15           Reverse clamshells  Green 5\"x15 Black 5\"x20 Black 5\"x20 Black 5\"x20 Black 5\"x20                    Ther Activity             Step ups (lat/cross)  8\"x15 ea 8\"x20 ea 8\"x20 ea 8\"x20 ea 8\"x20 ea       BOSU step ups (f/l)  X15 ea X20 ea X20 ea X20 ea X20 ea       Goblet squats             Side stepping with tband   Black x2 laps Black 2 laps Black 2 laps bLack 2 laps       Ecc leg lowering  8\"x15 8\"x20 8\"x20 8\"x20 8\"x20       Ecc SL sit to stand   Airex on chair x15 Airex on chair x20 Airex on chair x20 Airex on chair x20       Lunges over airex   X10 ea X15 ea X15 ea X15 ea       SL leg press                                 "